# Patient Record
Sex: FEMALE | Race: WHITE | NOT HISPANIC OR LATINO | Employment: FULL TIME | URBAN - METROPOLITAN AREA
[De-identification: names, ages, dates, MRNs, and addresses within clinical notes are randomized per-mention and may not be internally consistent; named-entity substitution may affect disease eponyms.]

---

## 2018-05-08 ENCOUNTER — ANESTHESIA - HEALTHEAST (OUTPATIENT)
Dept: SURGERY | Facility: HOSPITAL | Age: 33
End: 2018-05-08

## 2018-05-08 ASSESSMENT — MIFFLIN-ST. JEOR
SCORE: 2038.53
SCORE: 1848.02

## 2018-05-09 ENCOUNTER — SURGERY - HEALTHEAST (OUTPATIENT)
Dept: SURGERY | Facility: HOSPITAL | Age: 33
End: 2018-05-09

## 2021-05-26 ENCOUNTER — RECORDS - HEALTHEAST (OUTPATIENT)
Dept: ADMINISTRATIVE | Facility: CLINIC | Age: 36
End: 2021-05-26

## 2021-05-27 ENCOUNTER — RECORDS - HEALTHEAST (OUTPATIENT)
Dept: ADMINISTRATIVE | Facility: CLINIC | Age: 36
End: 2021-05-27

## 2021-05-28 ENCOUNTER — RECORDS - HEALTHEAST (OUTPATIENT)
Dept: ADMINISTRATIVE | Facility: CLINIC | Age: 36
End: 2021-05-28

## 2021-05-29 ENCOUNTER — RECORDS - HEALTHEAST (OUTPATIENT)
Dept: ADMINISTRATIVE | Facility: CLINIC | Age: 36
End: 2021-05-29

## 2021-06-01 ENCOUNTER — RECORDS - HEALTHEAST (OUTPATIENT)
Dept: ADMINISTRATIVE | Facility: CLINIC | Age: 36
End: 2021-06-01

## 2021-06-01 VITALS — BODY MASS INDEX: 48.82 KG/M2 | HEIGHT: 65 IN | WEIGHT: 293 LBS

## 2021-06-02 ENCOUNTER — RECORDS - HEALTHEAST (OUTPATIENT)
Dept: ADMINISTRATIVE | Facility: CLINIC | Age: 36
End: 2021-06-02

## 2021-06-17 NOTE — ANESTHESIA CARE TRANSFER NOTE
Last vitals:   Vitals:    05/09/18 0851   BP: 127/83   Pulse: 80   Resp: 20   Temp: 36.9  C (98.4  F)   SpO2: 96%     Patient's level of consciousness is awake  Spontaneous respirations: yes  Maintains airway independently: yes  Dentition unchanged: yes  Oropharynx: oropharynx clear of all foreign objects    QCDR Measures:  ASA# 20 - Surgical Safety Checklist: WHO surgical safety checklist completed prior to induction  PQRS# 430 - Adult PONV Prevention: 4558F - Pt received => 2 anti-emetic agents (different classes) preop & intraop  ASA# 8 - Peds PONV Prevention: NA - Not pediatric patient, not GA or 2 or more risk factors NOT present  PQRS# 424 - Cheryle-op Temp Management: 4559F - At least one body temp DOCUMENTED => 35.5C or 95.9F within required timeframe  PQRS# 426 - PACU Transfer Protocol: - Transfer of care checklist used  ASA# 14 - Acute Post-op Pain: ASA14B - Patient did NOT experience pain >= 7 out of 10

## 2021-06-17 NOTE — ANESTHESIA POSTPROCEDURE EVALUATION
Patient: Diamond Mendoza  WIDE LOCAL EXCISION OF VULVA CO2 LASER  Anesthesia type: MAC    Patient location: Phase II Recovery  Last vitals:   Vitals:    05/09/18 1000   BP:    Pulse:    Resp: 20   Temp:    SpO2:      Post vital signs: stable  Level of consciousness: awake and responds to simple questions  Post-anesthesia pain: pain controlled  Post-anesthesia nausea and vomiting: no  Pulmonary: unassisted  Cardiovascular: stable  Hydration: adequate  Anesthetic events: no    QCDR Measures:  ASA# 11 - Cheryle-op Cardiac Arrest: ASA11B - Patient did NOT experience unanticipated cardiac arrest  ASA# 12 - Cheryle-op Mortality Rate: ASA12B - Patient did NOT die  ASA# 13 - PACU Re-Intubation Rate: NA - No ETT / LMA used for case  ASA# 10 - Composite Anes Safety: ASA10A - No serious adverse event    Additional Notes:

## 2021-07-03 NOTE — ANESTHESIA PREPROCEDURE EVALUATION
Anesthesia Preprocedure Evaluation by Aylin Mitchell MD at 5/9/2018  6:54 AM     Author: Aylin Mitchell MD Service: -- Author Type: Physician    Filed: 5/9/2018  6:55 AM Date of Service: 5/9/2018  6:54 AM Status: Signed    : Aylin Mitchell MD (Physician)       Anesthesia Evaluation      Patient summary reviewed   No history of anesthetic complications     Airway   Mallampati: II  Neck ROM: full   Pulmonary - normal exam   (+) sleep apnea on no CPAP, , a smoker                         Cardiovascular - negative ROS and normal exam   Neuro/Psych    (+) depression, anxiety/panic attacks,     Endo/Other    (+) obesity (morbid),      GI/Hepatic/Renal - negative ROS           Dental                             Anesthesia Plan  Planned anesthetic: MAC  - will convert to LMA if patient having issues with MILY  - avoid fentanyl  - propofol infusion, ketamine boluses prn  ASA 3   Induction: intravenous   Anesthetic plan and risks discussed with: patient    Post-op plan: routine recovery

## 2023-03-20 ENCOUNTER — OFFICE VISIT (OUTPATIENT)
Dept: FAMILY MEDICINE | Facility: CLINIC | Age: 38
End: 2023-03-20
Payer: COMMERCIAL

## 2023-03-20 VITALS
HEART RATE: 71 BPM | BODY MASS INDEX: 48.82 KG/M2 | HEIGHT: 65 IN | TEMPERATURE: 97.9 F | RESPIRATION RATE: 14 BRPM | DIASTOLIC BLOOD PRESSURE: 74 MMHG | WEIGHT: 293 LBS | OXYGEN SATURATION: 95 % | SYSTOLIC BLOOD PRESSURE: 115 MMHG

## 2023-03-20 DIAGNOSIS — R06.83 SNORING: ICD-10-CM

## 2023-03-20 DIAGNOSIS — Z13.220 LIPID SCREENING: ICD-10-CM

## 2023-03-20 DIAGNOSIS — R20.2 TINGLING OF BOTH UPPER EXTREMITIES: ICD-10-CM

## 2023-03-20 DIAGNOSIS — Z11.59 NEED FOR HEPATITIS C SCREENING TEST: ICD-10-CM

## 2023-03-20 DIAGNOSIS — R22.9 LOCALIZED SUPERFICIAL SWELLING, MASS, OR LUMP: ICD-10-CM

## 2023-03-20 DIAGNOSIS — Z11.4 SCREENING FOR HIV (HUMAN IMMUNODEFICIENCY VIRUS): ICD-10-CM

## 2023-03-20 DIAGNOSIS — E66.01 MORBID OBESITY (H): Primary | ICD-10-CM

## 2023-03-20 DIAGNOSIS — Z13.29 SCREENING FOR THYROID DISORDER: ICD-10-CM

## 2023-03-20 LAB
ALBUMIN SERPL BCG-MCNC: 4.2 G/DL (ref 3.5–5.2)
ALP SERPL-CCNC: 62 U/L (ref 35–104)
ALT SERPL W P-5'-P-CCNC: 25 U/L (ref 10–35)
ANION GAP SERPL CALCULATED.3IONS-SCNC: 11 MMOL/L (ref 7–15)
AST SERPL W P-5'-P-CCNC: 32 U/L (ref 10–35)
BILIRUB SERPL-MCNC: 0.3 MG/DL
BUN SERPL-MCNC: 14.9 MG/DL (ref 6–20)
CALCIUM SERPL-MCNC: 9.7 MG/DL (ref 8.6–10)
CHLORIDE SERPL-SCNC: 106 MMOL/L (ref 98–107)
CHOLEST SERPL-MCNC: 174 MG/DL
CREAT SERPL-MCNC: 0.65 MG/DL (ref 0.51–0.95)
DEPRECATED HCO3 PLAS-SCNC: 26 MMOL/L (ref 22–29)
GFR SERPL CREATININE-BSD FRML MDRD: >90 ML/MIN/1.73M2
GLUCOSE SERPL-MCNC: 86 MG/DL (ref 70–99)
HBA1C MFR BLD: 5.2 % (ref 0–5.6)
HDLC SERPL-MCNC: 30 MG/DL
LDLC SERPL CALC-MCNC: 112 MG/DL
NONHDLC SERPL-MCNC: 144 MG/DL
POTASSIUM SERPL-SCNC: 4.4 MMOL/L (ref 3.4–5.3)
PROT SERPL-MCNC: 7.1 G/DL (ref 6.4–8.3)
SODIUM SERPL-SCNC: 143 MMOL/L (ref 136–145)
TRIGL SERPL-MCNC: 159 MG/DL
TSH SERPL DL<=0.005 MIU/L-ACNC: 1.05 UIU/ML (ref 0.3–4.2)

## 2023-03-20 PROCEDURE — 87389 HIV-1 AG W/HIV-1&-2 AB AG IA: CPT | Performed by: NURSE PRACTITIONER

## 2023-03-20 PROCEDURE — 80053 COMPREHEN METABOLIC PANEL: CPT | Performed by: NURSE PRACTITIONER

## 2023-03-20 PROCEDURE — 90471 IMMUNIZATION ADMIN: CPT | Performed by: NURSE PRACTITIONER

## 2023-03-20 PROCEDURE — 80061 LIPID PANEL: CPT | Performed by: NURSE PRACTITIONER

## 2023-03-20 PROCEDURE — 83036 HEMOGLOBIN GLYCOSYLATED A1C: CPT | Performed by: NURSE PRACTITIONER

## 2023-03-20 PROCEDURE — 99204 OFFICE O/P NEW MOD 45 MIN: CPT | Mod: 25 | Performed by: NURSE PRACTITIONER

## 2023-03-20 PROCEDURE — 84443 ASSAY THYROID STIM HORMONE: CPT | Performed by: NURSE PRACTITIONER

## 2023-03-20 PROCEDURE — 86803 HEPATITIS C AB TEST: CPT | Performed by: NURSE PRACTITIONER

## 2023-03-20 PROCEDURE — 90677 PCV20 VACCINE IM: CPT | Performed by: NURSE PRACTITIONER

## 2023-03-20 PROCEDURE — 36415 COLL VENOUS BLD VENIPUNCTURE: CPT | Performed by: NURSE PRACTITIONER

## 2023-03-20 ASSESSMENT — PATIENT HEALTH QUESTIONNAIRE - PHQ9: SUM OF ALL RESPONSES TO PHQ QUESTIONS 1-9: 3

## 2023-03-20 NOTE — PATIENT INSTRUCTIONS
"      Start using GlassBox Khadra. Try to use it 24 hours, every day for at least one week. This will help with accountability  Start Weighing yourself every day.     Nutrition Goals  1) Follow 4322-2263 calorie/day plan; can use [employer] khadra to help with diary.               -www.eatthismuch.com     2) 9\" Plate method (1/2 plate non-starchy vegetables/fruit, 1/4 plate lean protein, 1/4 plate whole grain starch - no more than 1/2 cup carb/meal)    3) Continue physical activity; can start with 10 minutes per day like going for a walk after dinner or at your lunchtime.    4) Meal prep tips: https://www.Afluenta.GiveCorps/healthyeats/healthy-tips/2019/10/meal-prep-tips-hacks-experts  5) Avoid snacking as able. If snack is needed use lean protein and/or fruit/vegetable. Examples:              - 2 cup popcorn              - 1 cup mixed berries              - 15 almonds, walnuts, cashews              - carrot/celery sticks and 2 tbsp low-fat ranch              - 1 hard boiled egg              - Part-skim mozzarella cheese stick              - Low-fat, low-sugar greek yogurt with 1/2 cup berries              - Med apple or pear              - sliced bell peppers with 1/2 cup salsa              - 1/2 cup roasted chickpeas              - sliced cucumbers with vinegar     100 calorie sweets: Smart Sweets, Fiber One desserts, Fit and Active 100 calorie snack sweets at [employer]; [employer]abis[employer] 100 calorie pre-portioned cookies, sugar-free pudding, sugar-free jello.     Snack Recipes:  - Banana and creamy PB dip (https://www.diabetesfoodhub.org/recipes/sweet-peanut-buttery-dip.html?home-category_id=23)  - Roasted chickpeas (https://www.diabetesfoodhub.org/recipes/roasted-and-spiced-chickpeas.html?home-category_id=23)  - Lemon Raspberry peyman seed pudding (https://www.diabetesfoodhub.org/recipes/lemon-raspberry- peyman-seed-pudding.html?home-category_id=23)  - Black bean hummus with carrot and celery sticks " "(https://www.diabetesfoodhub.org/recipes/black-bean-hummus.html?home-category_id=23)  - Greek yogurt chocolate mouse (https://www.diabetesfoodhub.org/recipes/greek-yogurt-chocolate-mousse.html?home-category_id=23)   - Broccoli Cheese Bites  (https://www.diabetesfoodhub.org/recipes/broccoli-cheese-bites.html?home-category_id=20)  - Chicken Satay with peanut sauce  (https://www.diabetesfoodAdap.tvb.org/recipes/blueberry-almond-chicken-salad-lettuce-wraps.html?home-category_id=20)  - Deviled Eggs  (https://www.diabetesfoodAdap.tvb.org/recipes/devilled-eggs.html?home-category_id=20)     Healthy Recipe Resources:  Books:  \"The Volumetrics Eating Plan\" by Ameena Mobley, Ph.D.  \"Cooking that Counts\" by editors of [employer]  \"Calorie Smart Meals\" cookbook by [employer] (200-500 calorie meals)     Websites:  www.MedAptus  www.myThings.org  https://www.diabetesfoodAdap.tvb.org/all-recipes.html  https://www.Lukkin.[employer]/  Https://www.Eventialsmyplate.gov/myplatekitchen  https://snaped.fns.usda.gov/recipes-menus   https://www.Life in Hi-Fi.[employer] Technology/gallery/7817226/dietitian-budget-high-protein-dinner-recipes/  https://www.authorSTREAM.com.[employer] X[employer] Technology/recipes/84/healthy-recipes/         Cultural Cuisines:  https://www.Life in Hi-Fi.com/recipes/86619/cuisines-regions//  https://www.Scoot NetworksnatHarlyn Medical.org/knowledge-center/recipes/  https://REPP/recipe-index/     Apps:  tuul jennifer (or website, mealime.com)   Sacha         The Plate Method  Http://www.Cynny/267969.pdf     Protein Sources   http://Cynny/638052.pdf      Carbohydrates  http://fvfiles.com/617124.pdf      Mindful Eating  http://Cynny/769185.pdf      Summary of Volumetrics Eating Plan  http://fvfiles.com/669696.pdf     "

## 2023-03-20 NOTE — PROGRESS NOTES
Assessment & Plan     Screening for HIV (human immunodeficiency virus)    - HIV Antigen Antibody Combo  - HIV Antigen Antibody Combo    Need for hepatitis C screening test    - Hepatitis C Screen Reflex to HCV RNA Quant and Genotype  - Hepatitis C Screen Reflex to HCV RNA Quant and Genotype    Morbid obesity (H)    - Comprehensive Weight Management  - Hemoglobin A1c  - Comprehensive metabolic panel (BMP + Alb, Alk Phos, ALT, AST, Total. Bili, TP)  - Lipid Profile (Chol, Trig, HDL, LDL calc)  - Hemoglobin A1c  - Comprehensive metabolic panel (BMP + Alb, Alk Phos, ALT, AST, Total. Bili, TP)  - Lipid Profile (Chol, Trig, HDL, LDL calc)    Localized superficial swelling, mass, or lump    - Adult General Surg Referral    Screening for thyroid disorder    - TSH  - TSH    Tingling of both upper extremities  - TSH   - HGBA1c\    Snoring  - symptoms of sleep apnea reviewed, she will monitor.    - labs appear stable. Your cholesterol is within a normal range, your HDL (good chol) is low. Ways to improve this value is by exercising more often. You can just start slow at 10 minutes per day, and then gradually increase. Recheck this lab in five years.   - tingling in fingers, leaning towards the onset of Carpal tunnel. TSH and hgba1c normal ranges. Conservative measures discussed. Follow up PRN.   - vitamin D supplements discussed.   - I spent time discussing lifestyle changes today. She is willing to follow up with weight management team.   - pap completed in 2020 per her recall. If IUD was placed in 2018 (per our records) she is due for a change and repeat PAP.                Nicotine/Tobacco Cessation:  She reports that she has been smoking cigarettes. She started smoking about 20 years ago. She has been smoking an average of .25 packs per day. She has never used smokeless tobacco.  Nicotine/Tobacco Cessation Plan:   Information offered: Patient not interested at this time      BMI:   Estimated body mass index is 51.59  "kg/m  as calculated from the following:    Height as of this encounter: 1.651 m (5' 5\").    Weight as of this encounter: 140.6 kg (310 lb).   Weight management plan: Patient referred to endocrine and/or weight management specialty        No follow-ups on file.    KIMBERLYN Ybarra CNP  [facility] FATUMA Fields is a 37 year old, presenting for the following health issues:  Establish Care (Wrist pain in both hands, lump on head), Musculoskeletal Problem, and Mass (Lump on top of head)    - would like to lose weight. Drinks two soda's per day and one sugar free energy drink. She drinks no juice. Drinks skim milk. She knows her diet is not the best. She does endorse snoring at night and feels tired during the day. She can get muscular pains. She has been noticing numbness and tingling in fingers, sometimes her arms fall asleep at night, and can get bilateral wrist pains. Her pap is up to date she believes back in 1-2020. She has an IUD but can not remember exactly when it was placed. Went to [facility].     Musculoskeletal Problem  Associated symptoms include fatigue, myalgias and numbness. Pertinent negatives include no chills or fever.   Mass  Associated symptoms include fatigue, myalgias and numbness. Pertinent negatives include no chills or fever.   History of Present Illness       Reason for visit:  Establishing care    She eats 2-3 servings of fruits and vegetables daily.She consumes 2 sweetened beverage(s) daily.She exercises with enough effort to increase her heart rate 30 to 60 minutes per day.  She exercises with enough effort to increase her heart rate 5 days per week.   She is taking medications regularly.             Review of Systems   Constitutional: Positive for fatigue. Negative for activity change, appetite change, chills, fever and unexpected weight change.   Eyes: Negative.    Respiratory: Negative.    Cardiovascular: Negative.    Gastrointestinal: " "Negative.    Endocrine: Negative.    Breasts:  negative.    Genitourinary: Negative.    Musculoskeletal: Positive for myalgias.   Allergic/Immunologic: Negative.    Neurological: Positive for numbness.   Hematological: Negative.    Psychiatric/Behavioral: Negative.             Objective    /74   Pulse 71   Temp 97.9  F (36.6  C) (Oral)   Resp 14   Ht 1.651 m (5' 5\")   Wt 140.6 kg (310 lb)   SpO2 95%   BMI 51.59 kg/m    Body mass index is 51.59 kg/m .  Physical Exam  Vitals and nursing note reviewed.   Constitutional:       Appearance: Normal appearance. She is obese.   HENT:      Mouth/Throat:      Mouth: Mucous membranes are moist.   Pulmonary:      Effort: Pulmonary effort is normal.   Musculoskeletal:      Right lower leg: No edema.      Left lower leg: No edema.   Skin:     General: Skin is warm.      Capillary Refill: Capillary refill takes less than 2 seconds.      Findings: Lesion present.      Comments: Nodule mobile cyst noted under scalp; right side, top of head, no skin color changes, no pain. Quarter size   Neurological:      General: No focal deficit present.      Mental Status: She is alert and oriented to person, place, and time.   Psychiatric:         Mood and Affect: Mood normal.         Behavior: Behavior normal.         Thought Content: Thought content normal.         Judgment: Judgment normal.            Results for orders placed or performed in visit on 03/20/23   HIV Antigen Antibody Combo     Status: Normal   Result Value Ref Range    HIV Antigen Antibody Combo Nonreactive Nonreactive   Hepatitis C Screen Reflex to HCV RNA Quant and Genotype     Status: Normal   Result Value Ref Range    Hepatitis C Antibody Nonreactive Nonreactive    Narrative    Assay performance characteristics have not been established for newborns, infants, and children.   Hemoglobin A1c     Status: Normal   Result Value Ref Range    Hemoglobin A1C 5.2 0.0 - 5.6 %   Comprehensive metabolic panel (BMP + Alb, " Alk Phos, ALT, AST, Total. Bili, TP)     Status: Normal   Result Value Ref Range    Sodium 143 136 - 145 mmol/L    Potassium 4.4 3.4 - 5.3 mmol/L    Chloride 106 98 - 107 mmol/L    Carbon Dioxide (CO2) 26 22 - 29 mmol/L    Anion Gap 11 7 - 15 mmol/L    Urea Nitrogen 14.9 6.0 - 20.0 mg/dL    Creatinine 0.65 0.51 - 0.95 mg/dL    Calcium 9.7 8.6 - 10.0 mg/dL    Glucose 86 70 - 99 mg/dL    Alkaline Phosphatase 62 35 - 104 U/L    AST 32 10 - 35 U/L    ALT 25 10 - 35 U/L    Protein Total 7.1 6.4 - 8.3 g/dL    Albumin 4.2 3.5 - 5.2 g/dL    Bilirubin Total 0.3 <=1.2 mg/dL    GFR Estimate >90 >60 mL/min/1.73m2   Lipid Profile (Chol, Trig, HDL, LDL calc)     Status: Abnormal   Result Value Ref Range    Cholesterol 174 <200 mg/dL    Triglycerides 159 (H) <150 mg/dL    Direct Measure HDL 30 (L) >=50 mg/dL    LDL Cholesterol Calculated 112 (H) <=100 mg/dL    Non HDL Cholesterol 144 (H) <130 mg/dL    Narrative    Cholesterol  Desirable:  <200 mg/dL    Triglycerides  Normal:  Less than 150 mg/dL  Borderline High:  150-199 mg/dL  High:  200-499 mg/dL  Very High:  Greater than or equal to 500 mg/dL    Direct Measure HDL  Female:  Greater than or equal to 50 mg/dL   Male:  Greater than or equal to 40 mg/dL    LDL Cholesterol  Desirable:  <100mg/dL  Above Desirable:  100-129 mg/dL   Borderline High:  130-159 mg/dL   High:  160-189 mg/dL   Very High:  >= 190 mg/dL    Non HDL Cholesterol  Desirable:  130 mg/dL  Above Desirable:  130-159 mg/dL  Borderline High:  160-189 mg/dL  High:  190-219 mg/dL  Very High:  Greater than or equal to 220 mg/dL   TSH     Status: Normal   Result Value Ref Range    TSH 1.05 0.30 - 4.20 uIU/mL

## 2023-03-21 LAB
HCV AB SERPL QL IA: NONREACTIVE
HIV 1+2 AB+HIV1 P24 AG SERPL QL IA: NONREACTIVE

## 2023-03-21 ASSESSMENT — ENCOUNTER SYMPTOMS
FEVER: 0
ACTIVITY CHANGE: 0
GASTROINTESTINAL NEGATIVE: 1
PSYCHIATRIC NEGATIVE: 1
UNEXPECTED WEIGHT CHANGE: 0
EYES NEGATIVE: 1
CHILLS: 0
CARDIOVASCULAR NEGATIVE: 1
MYALGIAS: 1
ALLERGIC/IMMUNOLOGIC NEGATIVE: 1
FATIGUE: 1
HEMATOLOGIC/LYMPHATIC NEGATIVE: 1
NUMBNESS: 1
RESPIRATORY NEGATIVE: 1
APPETITE CHANGE: 0
ENDOCRINE NEGATIVE: 1

## 2023-03-21 NOTE — RESULT ENCOUNTER NOTE
HI     Your labs appear stable. Your cholesterol is within a normal range, but your HDL (good chol) is low. Ways to improve this value is by exercising more often. You can just start slow at 10 minutes per day, and then gradually increase. Recheck this lab in five years.     Good news is your diabetes test is negative and TSH is within a normal range.     If you have any questions, please let me know. Also, please remember to check on your IUD insertion, you may need it changed.     Ashley

## 2023-04-07 ENCOUNTER — OFFICE VISIT (OUTPATIENT)
Dept: SURGERY | Facility: CLINIC | Age: 38
End: 2023-04-07
Attending: NURSE PRACTITIONER
Payer: COMMERCIAL

## 2023-04-07 ENCOUNTER — TELEPHONE (OUTPATIENT)
Dept: SURGERY | Facility: CLINIC | Age: 38
End: 2023-04-07

## 2023-04-07 VITALS
WEIGHT: 293 LBS | DIASTOLIC BLOOD PRESSURE: 88 MMHG | HEIGHT: 65 IN | SYSTOLIC BLOOD PRESSURE: 126 MMHG | BODY MASS INDEX: 48.82 KG/M2

## 2023-04-07 DIAGNOSIS — R22.9 LOCALIZED SUPERFICIAL SWELLING, MASS, OR LUMP: ICD-10-CM

## 2023-04-07 DIAGNOSIS — L72.3 SEBACEOUS CYST: Primary | ICD-10-CM

## 2023-04-07 PROCEDURE — 99203 OFFICE O/P NEW LOW 30 MIN: CPT | Performed by: SPECIALIST

## 2023-04-07 RX ORDER — CEFAZOLIN SODIUM/WATER 3 G/30 ML
3 SYRINGE (ML) INTRAVENOUS SEE ADMIN INSTRUCTIONS
Status: CANCELLED | OUTPATIENT
Start: 2023-06-01

## 2023-04-07 RX ORDER — ACETAMINOPHEN 325 MG/1
975 TABLET ORAL ONCE
Status: CANCELLED | OUTPATIENT
Start: 2023-06-01 | End: 2023-04-07

## 2023-04-07 RX ORDER — CEFAZOLIN SODIUM/WATER 3 G/30 ML
3 SYRINGE (ML) INTRAVENOUS
Status: CANCELLED | OUTPATIENT
Start: 2023-06-01

## 2023-04-07 NOTE — LETTER
4/7/2023         RE: Diamond Mendoza  [address]  [place] [address]        Dear Colleague,    Thank you for referring your patient, Diamond Mendoza, to the [facility] [place]. Please see a copy of my visit note below.        [facility] Surgery Consult    HPI:    37 year old year old female who I have been consulted by No Ref-Primary, Physician for evaluation of Cyst (Cyst on top right side of head, has been there for about 15 years, starting to get painful recently)  This is a cyst on the top of her head she had for many years became more painful recently.  She also another wound is developed next to it which is enlarging 2.    Allergies:Cortisone    History reviewed. No pertinent past medical history.    Past Surgical History:   Procedure Laterality Date     ANKLE SURGERY Left      BIOPSY CERVICAL, LOCAL EXCISION, SINGLE/MULTIPLE       LAPAROSCOPIC CHOLECYSTECTOMY N/A 05/09/2018    Procedure: CHOLECYSTECTOMY LAPAROSCOPIC;  Surgeon: Leslie Nash MD;  Location: [facility];  Service:      LAPAROSCOPY DIAGNOSTIC (GENERAL) N/A 05/09/2018    Procedure: WIDE LOCAL EXCISION OF VULVA CO2 LASER;  Surgeon: Lelia Kauffman MD;  Location: [facility];  Service:        CURRENT MEDS:  Current Outpatient Medications   Medication     HEXAVITAMINS PO     levonorgestrel (MIRENA) 20 mcg/24 hr (5 years) IUD     No current facility-administered medications for this visit.       Family History   Problem Relation Age of Onset     Diabetes Mother      Mental Illness Sister      Mental Illness Brother         reports that she has been smoking cigarettes. She started smoking about 20 years ago. She has been smoking an average of .25 packs per day. She has never used smokeless tobacco. She reports current alcohol use. She reports that she does not use drugs.    Review of Systems:  Negative except cyst on her head x2 pretty large 1 on the right  "side.    OBJECTIVE:  Vitals:    04/07/23 1238   BP: 126/88   Weight: 142.4 kg (314 lb)   Height: 1.651 m (5' 5\")     Body mass index is 52.25 kg/m .    EXAM:  GENERAL: This is a well-developed 37 year old female who appears her stated age  HEAD: normocephalic  HEENT: Pupils equal and reactive bilaterally, cyst on her head x2 largest ones about 3 to 4 cm in diameter.  MOUTH: mucus membranes intact  CARDIAC: RRR without murmur  CHEST/LUNG:  Clear to auscultation  ABDOMEN: Soft, nontender, nondistended, no masses  EXTREMITIES: Grossly normal, warm,   NEUROLOGIC: Focally intact, nonfocal  INTEGUMENT: No open lesions or ulcers  VASCULAR: Pulses intact, symmetrical upper and lower extremities.    Lab Results   Component Value Date    ALT 25 03/20/2023    AST 32 03/20/2023    ALKPHOS 62 03/20/2023        Assessment/Plan:   Cyst on her head and her scalp.  Most likely sebaceous    Discussed excision and closure under local MAC anesthesia in same-day surgery setting.  With this set up in the near future discussed risk and benefits and she wished to proceed.    No follow-ups on file.    Sg Clark MD  General Surgery [phone]  Vascular Surgery [phone]          Again, thank you for allowing me to participate in the care of your patient.        Sincerely,        Sg Clark MD    "

## 2023-04-07 NOTE — PROGRESS NOTES
"[facility] Surgery Consult    HPI:    37 year old year old female who I have been consulted by No Ref-Primary, Physician for evaluation of Cyst (Cyst on top right side of head, has been there for about 15 years, starting to get painful recently)  This is a cyst on the top of her head she had for many years became more painful recently.  She also another wound is developed next to it which is enlarging 2.    Allergies:Cortisone    History reviewed. No pertinent past medical history.    Past Surgical History:   Procedure Laterality Date     ANKLE SURGERY Left      BIOPSY CERVICAL, LOCAL EXCISION, SINGLE/MULTIPLE       LAPAROSCOPIC CHOLECYSTECTOMY N/A 05/09/2018    Procedure: CHOLECYSTECTOMY LAPAROSCOPIC;  Surgeon: Leslie Nash MD;  Location: [facility];  Service:      LAPAROSCOPY DIAGNOSTIC (GENERAL) N/A 05/09/2018    Procedure: WIDE LOCAL EXCISION OF VULVA CO2 LASER;  Surgeon: Lelia Kauffman MD;  Location: [facility];  Service:        CURRENT MEDS:  Current Outpatient Medications   Medication     HEXAVITAMINS PO     levonorgestrel (MIRENA) 20 mcg/24 hr (5 years) IUD     No current facility-administered medications for this visit.       Family History   Problem Relation Age of Onset     Diabetes Mother      Mental Illness Sister      Mental Illness Brother         reports that she has been smoking cigarettes. She started smoking about 20 years ago. She has been smoking an average of .25 packs per day. She has never used smokeless tobacco. She reports current alcohol use. She reports that she does not use drugs.    Review of Systems:  Negative except cyst on her head x2 pretty large 1 on the right side.    OBJECTIVE:  Vitals:    04/07/23 1238   BP: 126/88   Weight: 142.4 kg (314 lb)   Height: 1.651 m (5' 5\")     Body mass index is 52.25 kg/m .    EXAM:  GENERAL: This is a well-developed 37 year old female who appears her stated age  HEAD: normocephalic  HEENT: Pupils equal and " reactive bilaterally, cyst on her head x2 largest ones about 3 to 4 cm in diameter.  MOUTH: mucus membranes intact  CARDIAC: RRR without murmur  CHEST/LUNG:  Clear to auscultation  ABDOMEN: Soft, nontender, nondistended, no masses  EXTREMITIES: Grossly normal, warm,   NEUROLOGIC: Focally intact, nonfocal  INTEGUMENT: No open lesions or ulcers  VASCULAR: Pulses intact, symmetrical upper and lower extremities.    Lab Results   Component Value Date    ALT 25 03/20/2023    AST 32 03/20/2023    ALKPHOS 62 03/20/2023        Assessment/Plan:   Cyst on her head and her scalp.  Most likely sebaceous    Discussed excision and closure under local MAC anesthesia in same-day surgery setting.  With this set up in the near future discussed risk and benefits and she wished to proceed.    No follow-ups on file.    Sg Clark MD  General Surgery [phone]  Vascular Surgery [phone]

## 2023-05-14 ENCOUNTER — HEALTH MAINTENANCE LETTER (OUTPATIENT)
Age: 38
End: 2023-05-14

## 2023-05-22 ENCOUNTER — OFFICE VISIT (OUTPATIENT)
Dept: FAMILY MEDICINE | Facility: CLINIC | Age: 38
End: 2023-05-22
Payer: COMMERCIAL

## 2023-05-22 VITALS
HEIGHT: 65 IN | WEIGHT: 293 LBS | BODY MASS INDEX: 48.82 KG/M2 | DIASTOLIC BLOOD PRESSURE: 74 MMHG | SYSTOLIC BLOOD PRESSURE: 110 MMHG | OXYGEN SATURATION: 96 % | TEMPERATURE: 98 F | HEART RATE: 80 BPM

## 2023-05-22 DIAGNOSIS — R06.83 SNORING: ICD-10-CM

## 2023-05-22 DIAGNOSIS — Z01.818 PREOP GENERAL PHYSICAL EXAM: Primary | ICD-10-CM

## 2023-05-22 DIAGNOSIS — E66.01 MORBID OBESITY (H): ICD-10-CM

## 2023-05-22 DIAGNOSIS — F17.210 CIGARETTE SMOKER: ICD-10-CM

## 2023-05-22 DIAGNOSIS — L72.9 CYST OF SKIN: ICD-10-CM

## 2023-05-22 PROCEDURE — 99214 OFFICE O/P EST MOD 30 MIN: CPT | Performed by: NURSE PRACTITIONER

## 2023-05-22 NOTE — H&P (VIEW-ONLY)
[facility]  [address] [place] [id number]  Phone: [phone]  Fax: [phone]  Primary Provider: Delmis Saini  Pre-op Performing Provider: DELMIS SAINI      PREOPERATIVE EVALUATION:  Today's date: 5/22/2023    Diamond Mendoza is a 37 year old female who presents for a preoperative evaluation.      5/22/2023     2:47 PM   Additional Questions   Roomed by Area F     Surgical Information:  Surgery/Procedure: Excsion of head lesion/cyst  Surgery Location: [facility]  Surgeon: Dr. Clark   Surgery Date: 06/01/2023  Time of Surgery: 10:00am  Where patient plans to recover: At home with family  Fax number for surgical facility: Note does not need to be faxed, will be available electronically in Epic.    Assessment & Plan     The proposed surgical procedure is considered INTERMEDIATE risk.    Morbid obesity (H)      Cyst of skin      Snoring      Cigarette smoker  - smoking cessation discussed. She plans on quitting cold turkey    Preop general physical exam         - No identified additional risk factors other than previously addressed    Antiplatelet or Anticoagulation Medication Instructions:   - Patient is on no antiplatelet or anticoagulation medications.    Additional Medication Instructions:  Patient is on no additional chronic medications    RECOMMENDATION:  APPROVAL GIVEN to proceed with proposed procedure, without further diagnostic evaluation.        Subjective   HPI related to upcoming procedure: cyst on the top of her head, she had for many years, became more painful recently.          5/22/2023     2:41 PM   Preop Questions   1. Have you ever had a heart attack or stroke? No   2. Have you ever had surgery on your heart or blood vessels, such as a stent placement, a coronary artery bypass, or surgery on an artery in your head, neck, heart, or legs? No   3. Do you have chest pain with activity? No   4. Do you have a history of  heart  failure? No   5. Do you currently have a cold, bronchitis or symptoms of other infection? No   6. Do you have a cough, shortness of breath, or wheezing? No   7. Do you or anyone in your family have previous history of blood clots? Unknown; grandfather had a stent place   8. Do you or does anyone in your family have a serious bleeding problem such as prolonged bleeding following surgeries or cuts? No   9. Have you ever had problems with anemia or been told to take iron pills? No   10. Have you had any abnormal blood loss such as black, tarry or bloody stools, or abnormal vaginal bleeding? No   11. Have you ever had a blood transfusion? No   12. Are you willing to have a blood transfusion if it is medically needed before, during, or after your surgery? Yes   13. Have you or any of your relatives ever had problems with anesthesia? No   14. Do you have sleep apnea, excessive snoring or daytime drowsiness? No- snores   15. Do you have any artifical heart valves or other implanted medical devices like a pacemaker, defibrillator, or continuous glucose monitor? No   16. Do you have artificial joints? No   17. Are you allergic to latex? No   18. Is there any chance that you may be pregnant? No       Status of Chronic Conditions:  See problem list for active medical problems.  Problems all longstanding and stable, except as noted/documented.  See ROS for pertinent symptoms related to these conditions.      Review of Systems  CONSTITUTIONAL: NEGATIVE for fever, chills, change in weight  ENT/MOUTH: NEGATIVE for ear, mouth and throat problems  RESP: NEGATIVE for significant cough or SOB  CV: NEGATIVE for chest pain, palpitations or peripheral edema    Patient Active Problem List    Diagnosis Date Noted     Morbid obesity (H) 03/20/2023     Priority: Medium     Cholecystitis 05/09/2016     Priority: Medium     Anxiety      Priority: Medium     Depression      Priority: Medium      No past medical history on file.  Past Surgical  "History:   Procedure Laterality Date     ANKLE SURGERY Left      BIOPSY CERVICAL, LOCAL EXCISION, SINGLE/MULTIPLE       LAPAROSCOPIC CHOLECYSTECTOMY N/A 05/09/2018    Procedure: CHOLECYSTECTOMY LAPAROSCOPIC;  Surgeon: Leslie Nash MD;  Location: [facility];  Service:      LAPAROSCOPY DIAGNOSTIC (GENERAL) N/A 05/09/2018    Procedure: WIDE LOCAL EXCISION OF VULVA CO2 LASER;  Surgeon: Lelia Kauffman MD;  Location: [facility];  Service:      Current Outpatient Medications   Medication Sig Dispense Refill     levonorgestrel (MIRENA) 20 mcg/24 hr (5 years) IUD [LEVONORGESTREL (MIRENA) 20 MCG/24 HR (5 YEARS) IUD] 1 each by Intrauterine route.         Allergies   Allergen Reactions     Cortisone Hives        Social History     Tobacco Use     Smoking status: Every Day     Packs/day: 0.25     Types: Cigarettes     Start date: 3/6/2003     Smokeless tobacco: Never     Tobacco comments:     not interested   Vaping Use     Vaping status: Not on file   Substance Use Topics     Alcohol use: Yes     Comment: Alcoholic Drinks/day: occasional use     Family History   Problem Relation Age of Onset     Diabetes Mother      Mental Illness Brother      Diabetes Maternal Grandfather      No Known Problems Paternal Grandmother      No Known Problems Paternal Grandfather      History   Drug Use No         Objective     /74 (BP Location: Left arm, Patient Position: Sitting, Cuff Size: Adult Large)   Pulse 80   Temp 98  F (36.7  C) (Oral)   Ht 1.639 m (5' 4.53\")   Wt 143 kg (315 lb 3.2 oz)   SpO2 96%   BMI 53.22 kg/m      Physical Exam    GENERAL APPEARANCE: healthy, alert and no distress     EYES: EOMI, PERRL     HENT: ear canals and TM's normal and nose and mouth without ulcers or lesions     NECK: no adenopathy, no asymmetry, masses, or scars and thyroid normal to palpation     RESP: lungs clear to auscultation - no rales, rhonchi or wheezes     CV: regular rates and rhythm, normal S1 S2, no S3 or S4 " and no murmur, click or rub     ABDOMEN:  soft, nontender, no HSM or masses and bowel sounds normal     MS: extremities normal- no gross deformities noted, no evidence of inflammation in joints, FROM in all extremities.     SKIN: no suspicious lesions or rashes     NEURO: Normal strength and tone, sensory exam grossly normal, mentation intact and speech normal     PSYCH: mentation appears normal. and affect normal/bright     LYMPHATICS: No cervical adenopathy    Recent Labs   Lab Test 03/20/23  1447      POTASSIUM 4.4   CR 0.65   A1C 5.2      Diagnostics:  No results found for this or any previous visit (from the past 720 hour(s)).   No EKG required for low risk surgery (cataract, skin procedure, breast biopsy, etc).    Revised Cardiac Risk Index (RCRI):  The patient has the following serious cardiovascular risks for perioperative complications:   - No serious cardiac risks = 0 points     [id number]}         Signed Electronically by: KIMBERLYN Ybarra CNP  Copy of this evaluation report is provided to requesting physician.

## 2023-05-22 NOTE — PROGRESS NOTES
[facility]  [address] [place] [address]  Phone: [phone]  Fax: [phone]  Primary Provider: Delmis Saini  Pre-op Performing Provider: DELMIS SAINI      PREOPERATIVE EVALUATION:  Today's date: 5/22/2023    Diamond Mendoza is a 37 year old female who presents for a preoperative evaluation.      5/22/2023     2:47 PM   Additional Questions   Roomed by Area F     Surgical Information:  Surgery/Procedure: Excsion of head lesion/cyst  Surgery Location: [facility]  Surgeon: Dr. Clark   Surgery Date: 06/01/2023  Time of Surgery: 10:00am  Where patient plans to recover: At home with family  Fax number for surgical facility: Note does not need to be faxed, will be available electronically in Epic.    Assessment & Plan     The proposed surgical procedure is considered INTERMEDIATE risk.    Morbid obesity (H)      Cyst of skin      Snoring      Cigarette smoker  - smoking cessation discussed. She plans on quitting cold turkey    Preop general physical exam         - No identified additional risk factors other than previously addressed    Antiplatelet or Anticoagulation Medication Instructions:   - Patient is on no antiplatelet or anticoagulation medications.    Additional Medication Instructions:  Patient is on no additional chronic medications    RECOMMENDATION:  APPROVAL GIVEN to proceed with proposed procedure, without further diagnostic evaluation.        Subjective   HPI related to upcoming procedure: cyst on the top of her head, she had for many years, became more painful recently.          5/22/2023     2:41 PM   Preop Questions   1. Have you ever had a heart attack or stroke? No   2. Have you ever had surgery on your heart or blood vessels, such as a stent placement, a coronary artery bypass, or surgery on an artery in your head, neck, heart, or legs? No   3. Do you have chest pain with activity? No   4. Do you have a history of  heart  failure? No   5. Do you currently have a cold, bronchitis or symptoms of other infection? No   6. Do you have a cough, shortness of breath, or wheezing? No   7. Do you or anyone in your family have previous history of blood clots? Unknown; grandfather had a stent place   8. Do you or does anyone in your family have a serious bleeding problem such as prolonged bleeding following surgeries or cuts? No   9. Have you ever had problems with anemia or been told to take iron pills? No   10. Have you had any abnormal blood loss such as black, tarry or bloody stools, or abnormal vaginal bleeding? No   11. Have you ever had a blood transfusion? No   12. Are you willing to have a blood transfusion if it is medically needed before, during, or after your surgery? Yes   13. Have you or any of your relatives ever had problems with anesthesia? No   14. Do you have sleep apnea, excessive snoring or daytime drowsiness? No- snores   15. Do you have any artifical heart valves or other implanted medical devices like a pacemaker, defibrillator, or continuous glucose monitor? No   16. Do you have artificial joints? No   17. Are you allergic to latex? No   18. Is there any chance that you may be pregnant? No       Status of Chronic Conditions:  See problem list for active medical problems.  Problems all longstanding and stable, except as noted/documented.  See ROS for pertinent symptoms related to these conditions.      Review of Systems  CONSTITUTIONAL: NEGATIVE for fever, chills, change in weight  ENT/MOUTH: NEGATIVE for ear, mouth and throat problems  RESP: NEGATIVE for significant cough or SOB  CV: NEGATIVE for chest pain, palpitations or peripheral edema    Patient Active Problem List    Diagnosis Date Noted     Morbid obesity (H) 03/20/2023     Priority: Medium     Cholecystitis 05/09/2016     Priority: Medium     Anxiety      Priority: Medium     Depression      Priority: Medium      No past medical history on file.  Past Surgical  "History:   Procedure Laterality Date     ANKLE SURGERY Left      BIOPSY CERVICAL, LOCAL EXCISION, SINGLE/MULTIPLE       LAPAROSCOPIC CHOLECYSTECTOMY N/A 05/09/2018    Procedure: CHOLECYSTECTOMY LAPAROSCOPIC;  Surgeon: Leslie Nash MD;  Location: [facility];  Service:      LAPAROSCOPY DIAGNOSTIC (GENERAL) N/A 05/09/2018    Procedure: WIDE LOCAL EXCISION OF VULVA CO2 LASER;  Surgeon: Lelia Kauffman MD;  Location: [facility];  Service:      Current Outpatient Medications   Medication Sig Dispense Refill     levonorgestrel (MIRENA) 20 mcg/24 hr (5 years) IUD [LEVONORGESTREL (MIRENA) 20 MCG/24 HR (5 YEARS) IUD] 1 each by Intrauterine route.         Allergies   Allergen Reactions     Cortisone Hives        Social History     Tobacco Use     Smoking status: Every Day     Packs/day: 0.25     Types: Cigarettes     Start date: 3/6/2003     Smokeless tobacco: Never     Tobacco comments:     not interested   Vaping Use     Vaping status: Not on file   Substance Use Topics     Alcohol use: Yes     Comment: Alcoholic Drinks/day: occasional use     Family History   Problem Relation Age of Onset     Diabetes Mother      Mental Illness Brother      Diabetes Maternal Grandfather      No Known Problems Paternal Grandmother      No Known Problems Paternal Grandfather      History   Drug Use No         Objective     /74 (BP Location: Left arm, Patient Position: Sitting, Cuff Size: Adult Large)   Pulse 80   Temp 98  F (36.7  C) (Oral)   Ht 1.639 m (5' 4.53\")   Wt 143 kg (315 lb 3.2 oz)   SpO2 96%   BMI 53.22 kg/m      Physical Exam    GENERAL APPEARANCE: healthy, alert and no distress     EYES: EOMI, PERRL     HENT: ear canals and TM's normal and nose and mouth without ulcers or lesions     NECK: no adenopathy, no asymmetry, masses, or scars and thyroid normal to palpation     RESP: lungs clear to auscultation - no rales, rhonchi or wheezes     CV: regular rates and rhythm, normal S1 S2, no S3 or S4 " and no murmur, click or rub     ABDOMEN:  soft, nontender, no HSM or masses and bowel sounds normal     MS: extremities normal- no gross deformities noted, no evidence of inflammation in joints, FROM in all extremities.     SKIN: no suspicious lesions or rashes     NEURO: Normal strength and tone, sensory exam grossly normal, mentation intact and speech normal     PSYCH: mentation appears normal. and affect normal/bright     LYMPHATICS: No cervical adenopathy    Recent Labs   Lab Test 03/20/23  1447      POTASSIUM 4.4   CR 0.65   A1C 5.2      Diagnostics:  No results found for this or any previous visit (from the past 720 hour(s)).   No EKG required for low risk surgery (cataract, skin procedure, breast biopsy, etc).    Revised Cardiac Risk Index (RCRI):  The patient has the following serious cardiovascular risks for perioperative complications:   - No serious cardiac risks = 0 points     [id number]}         Signed Electronically by: KIMBERLYN Ybarra CNP  Copy of this evaluation report is provided to requesting physician.

## 2023-05-25 ENCOUNTER — LAB REQUISITION (OUTPATIENT)
Dept: LAB | Facility: CLINIC | Age: 38
End: 2023-05-25
Payer: COMMERCIAL

## 2023-05-25 DIAGNOSIS — Z12.4 ENCOUNTER FOR SCREENING FOR MALIGNANT NEOPLASM OF CERVIX: ICD-10-CM

## 2023-05-25 PROCEDURE — G0145 SCR C/V CYTO,THINLAYER,RESCR: HCPCS | Mod: ORL | Performed by: NURSE PRACTITIONER

## 2023-05-25 PROCEDURE — 87624 HPV HI-RISK TYP POOLED RSLT: CPT | Mod: ORL | Performed by: NURSE PRACTITIONER

## 2023-05-31 LAB
BKR LAB AP GYN ADEQUACY: NORMAL
BKR LAB AP GYN INTERPRETATION: NORMAL
BKR LAB AP HPV REFLEX: NORMAL
BKR LAB AP LMP: NORMAL
BKR LAB AP PREVIOUS ABNL DX: NORMAL
BKR LAB AP PREVIOUS ABNORMAL: NORMAL
PATH REPORT.COMMENTS IMP SPEC: NORMAL
PATH REPORT.COMMENTS IMP SPEC: NORMAL
PATH REPORT.RELEVANT HX SPEC: NORMAL

## 2023-06-01 ENCOUNTER — HOSPITAL ENCOUNTER (OUTPATIENT)
Facility: HOSPITAL | Age: 38
Discharge: HOME | End: 2023-06-01
Attending: SPECIALIST | Admitting: SPECIALIST
Payer: COMMERCIAL

## 2023-06-01 ENCOUNTER — ANESTHESIA EVENT (OUTPATIENT)
Dept: SURGERY | Facility: HOSPITAL | Age: 38
End: 2023-06-01
Payer: COMMERCIAL

## 2023-06-01 ENCOUNTER — ANESTHESIA (OUTPATIENT)
Dept: SURGERY | Facility: HOSPITAL | Age: 38
End: 2023-06-01
Payer: COMMERCIAL

## 2023-06-01 VITALS
RESPIRATION RATE: 18 BRPM | DIASTOLIC BLOOD PRESSURE: 89 MMHG | OXYGEN SATURATION: 96 % | SYSTOLIC BLOOD PRESSURE: 139 MMHG | HEART RATE: 69 BPM | BODY MASS INDEX: 50.02 KG/M2 | HEIGHT: 64 IN | WEIGHT: 293 LBS | TEMPERATURE: 98.2 F

## 2023-06-01 DIAGNOSIS — D36.7 DERMOID CYST OF HEAD: Primary | ICD-10-CM

## 2023-06-01 PROCEDURE — 250N000013 HC RX MED GY IP 250 OP 250 PS 637: Performed by: SPECIALIST

## 2023-06-01 PROCEDURE — 258N000003 HC RX IP 258 OP 636: Performed by: ANESTHESIOLOGY

## 2023-06-01 PROCEDURE — 250N000009 HC RX 250: Performed by: SPECIALIST

## 2023-06-01 PROCEDURE — 710N000012 HC RECOVERY PHASE 2, PER MINUTE: Performed by: SPECIALIST

## 2023-06-01 PROCEDURE — 11420 EXC H-F-NK-SP B9+MARG 0.5/<: CPT | Mod: 51 | Performed by: SPECIALIST

## 2023-06-01 PROCEDURE — 250N000011 HC RX IP 250 OP 636: Performed by: NURSE ANESTHETIST, CERTIFIED REGISTERED

## 2023-06-01 PROCEDURE — 370N000017 HC ANESTHESIA TECHNICAL FEE, PER MIN: Performed by: SPECIALIST

## 2023-06-01 PROCEDURE — 272N000001 HC OR GENERAL SUPPLY STERILE: Performed by: SPECIALIST

## 2023-06-01 PROCEDURE — 11422 EXC H-F-NK-SP B9+MARG 1.1-2: CPT | Performed by: SPECIALIST

## 2023-06-01 PROCEDURE — 360N000074 HC SURGERY LEVEL 1, PER MIN: Performed by: SPECIALIST

## 2023-06-01 PROCEDURE — 250N000009 HC RX 250: Performed by: NURSE ANESTHETIST, CERTIFIED REGISTERED

## 2023-06-01 PROCEDURE — 250N000011 HC RX IP 250 OP 636: Performed by: SPECIALIST

## 2023-06-01 PROCEDURE — 999N000141 HC STATISTIC PRE-PROCEDURE NURSING ASSESSMENT: Performed by: SPECIALIST

## 2023-06-01 RX ORDER — ONDANSETRON 2 MG/ML
4 INJECTION INTRAMUSCULAR; INTRAVENOUS EVERY 30 MIN PRN
Status: DISCONTINUED | OUTPATIENT
Start: 2023-06-01 | End: 2023-06-01 | Stop reason: HOSPADM

## 2023-06-01 RX ORDER — OXYCODONE HYDROCHLORIDE 5 MG/1
5 TABLET ORAL
Status: DISCONTINUED | OUTPATIENT
Start: 2023-06-01 | End: 2023-06-01 | Stop reason: HOSPADM

## 2023-06-01 RX ORDER — OXYCODONE HYDROCHLORIDE 5 MG/1
10 TABLET ORAL
Status: DISCONTINUED | OUTPATIENT
Start: 2023-06-01 | End: 2023-06-01 | Stop reason: HOSPADM

## 2023-06-01 RX ORDER — PROPOFOL 10 MG/ML
INJECTION, EMULSION INTRAVENOUS CONTINUOUS PRN
Status: DISCONTINUED | OUTPATIENT
Start: 2023-06-01 | End: 2023-06-01

## 2023-06-01 RX ORDER — PROPOFOL 10 MG/ML
INJECTION, EMULSION INTRAVENOUS PRN
Status: DISCONTINUED | OUTPATIENT
Start: 2023-06-01 | End: 2023-06-01

## 2023-06-01 RX ORDER — BUPIVACAINE HYDROCHLORIDE AND EPINEPHRINE 2.5; 5 MG/ML; UG/ML
INJECTION, SOLUTION EPIDURAL; INFILTRATION; INTRACAUDAL; PERINEURAL PRN
Status: DISCONTINUED | OUTPATIENT
Start: 2023-06-01 | End: 2023-06-01 | Stop reason: HOSPADM

## 2023-06-01 RX ORDER — KETAMINE HYDROCHLORIDE 10 MG/ML
INJECTION INTRAMUSCULAR; INTRAVENOUS PRN
Status: DISCONTINUED | OUTPATIENT
Start: 2023-06-01 | End: 2023-06-01

## 2023-06-01 RX ORDER — GLYCOPYRROLATE 0.2 MG/ML
INJECTION, SOLUTION INTRAMUSCULAR; INTRAVENOUS PRN
Status: DISCONTINUED | OUTPATIENT
Start: 2023-06-01 | End: 2023-06-01

## 2023-06-01 RX ORDER — CEFAZOLIN SODIUM/WATER 3 G/30 ML
3 SYRINGE (ML) INTRAVENOUS
Status: COMPLETED | OUTPATIENT
Start: 2023-06-01 | End: 2023-06-01

## 2023-06-01 RX ORDER — SODIUM CHLORIDE, SODIUM LACTATE, POTASSIUM CHLORIDE, CALCIUM CHLORIDE 600; 310; 30; 20 MG/100ML; MG/100ML; MG/100ML; MG/100ML
INJECTION, SOLUTION INTRAVENOUS CONTINUOUS
Status: DISCONTINUED | OUTPATIENT
Start: 2023-06-01 | End: 2023-06-01 | Stop reason: HOSPADM

## 2023-06-01 RX ORDER — LIDOCAINE 40 MG/G
CREAM TOPICAL
Status: DISCONTINUED | OUTPATIENT
Start: 2023-06-01 | End: 2023-06-01 | Stop reason: HOSPADM

## 2023-06-01 RX ORDER — HYDROCODONE BITARTRATE AND ACETAMINOPHEN 5; 325 MG/1; MG/1
1-2 TABLET ORAL EVERY 6 HOURS PRN
Qty: 15 TABLET | Refills: 0 | Status: SHIPPED | OUTPATIENT
Start: 2023-06-01 | End: 2023-06-04

## 2023-06-01 RX ORDER — ACETAMINOPHEN 325 MG/1
975 TABLET ORAL ONCE
Status: COMPLETED | OUTPATIENT
Start: 2023-06-01 | End: 2023-06-01

## 2023-06-01 RX ORDER — CEFAZOLIN SODIUM/WATER 3 G/30 ML
3 SYRINGE (ML) INTRAVENOUS SEE ADMIN INSTRUCTIONS
Status: DISCONTINUED | OUTPATIENT
Start: 2023-06-01 | End: 2023-06-01 | Stop reason: HOSPADM

## 2023-06-01 RX ORDER — GINSENG 100 MG
CAPSULE ORAL PRN
Status: DISCONTINUED | OUTPATIENT
Start: 2023-06-01 | End: 2023-06-01 | Stop reason: HOSPADM

## 2023-06-01 RX ORDER — ONDANSETRON 4 MG/1
4 TABLET, ORALLY DISINTEGRATING ORAL EVERY 30 MIN PRN
Status: DISCONTINUED | OUTPATIENT
Start: 2023-06-01 | End: 2023-06-01 | Stop reason: HOSPADM

## 2023-06-01 RX ADMIN — ACETAMINOPHEN 975 MG: 325 TABLET ORAL at 11:30

## 2023-06-01 RX ADMIN — KETAMINE HYDROCHLORIDE 20 MG: 10 INJECTION, SOLUTION INTRAMUSCULAR; INTRAVENOUS at 12:17

## 2023-06-01 RX ADMIN — KETAMINE HYDROCHLORIDE 10 MG: 10 INJECTION, SOLUTION INTRAMUSCULAR; INTRAVENOUS at 12:23

## 2023-06-01 RX ADMIN — GLYCOPYRROLATE 0.2 MG: 0.2 INJECTION INTRAMUSCULAR; INTRAVENOUS at 12:17

## 2023-06-01 RX ADMIN — KETAMINE HYDROCHLORIDE 20 MG: 10 INJECTION, SOLUTION INTRAMUSCULAR; INTRAVENOUS at 12:25

## 2023-06-01 RX ADMIN — Medication 3 G: at 12:10

## 2023-06-01 RX ADMIN — MIDAZOLAM 2 MG: 1 INJECTION INTRAMUSCULAR; INTRAVENOUS at 12:10

## 2023-06-01 RX ADMIN — PROPOFOL 20 MG: 10 INJECTION, EMULSION INTRAVENOUS at 12:17

## 2023-06-01 RX ADMIN — PROPOFOL 150 MCG/KG/MIN: 10 INJECTION, EMULSION INTRAVENOUS at 12:14

## 2023-06-01 RX ADMIN — PROPOFOL 30 MG: 10 INJECTION, EMULSION INTRAVENOUS at 12:23

## 2023-06-01 RX ADMIN — PROPOFOL 10 MG: 10 INJECTION, EMULSION INTRAVENOUS at 12:20

## 2023-06-01 RX ADMIN — SODIUM CHLORIDE, POTASSIUM CHLORIDE, SODIUM LACTATE AND CALCIUM CHLORIDE: 600; 310; 30; 20 INJECTION, SOLUTION INTRAVENOUS at 11:29

## 2023-06-01 ASSESSMENT — ACTIVITIES OF DAILY LIVING (ADL)
ADLS_ACUITY_SCORE: 35
ADLS_ACUITY_SCORE: 18

## 2023-06-01 ASSESSMENT — LIFESTYLE VARIABLES: TOBACCO_USE: 1

## 2023-06-01 NOTE — ANESTHESIA PREPROCEDURE EVALUATION
Anesthesia Pre-Procedure Evaluation    Patient: Diamond Mendoza   MRN: [id number] : 1985        Procedure : Procedure(s):  EXCISION, LESION, HEAD          Past Medical History:   Diagnosis Date     Anxiety      Cigarette smoker      Cyst of skin      Depression      Morbid obesity (H)      Snoring       Past Surgical History:   Procedure Laterality Date     ANKLE SURGERY Left      BIOPSY CERVICAL, LOCAL EXCISION, SINGLE/MULTIPLE       LAPAROSCOPIC CHOLECYSTECTOMY N/A 2018    Procedure: CHOLECYSTECTOMY LAPAROSCOPIC;  Surgeon: eLslie Nash MD;  Location: [facility];  Service:      LAPAROSCOPY DIAGNOSTIC (GENERAL) N/A 2018    Procedure: WIDE LOCAL EXCISION OF VULVA CO2 LASER;  Surgeon: Lelia Kauffman MD;  Location: [facility];  Service:       Allergies   Allergen Reactions     Cortisone Hives      Social History     Tobacco Use     Smoking status: Every Day     Packs/day: 0.50     Types: Cigarettes     Start date: 3/6/2003     Smokeless tobacco: Never     Tobacco comments:     not interested   Vaping Use     Vaping status: Not on file   Substance Use Topics     Alcohol use: Yes     Comment: Alcoholic Drinks/day: occasional use      Wt Readings from Last 1 Encounters:   23 142 kg (313 lb)        Anesthesia Evaluation   Pt has had prior anesthetic.         ROS/MED HX  ENT/Pulmonary:     (+) MILY risk factors, snores loudly, obese, tobacco use,     Neurologic:       Cardiovascular:  - neg cardiovascular ROS     METS/Exercise Tolerance:     Hematologic:  - neg hematologic  ROS     Musculoskeletal:  - neg musculoskeletal ROS     GI/Hepatic:  - neg GI/hepatic ROS     Renal/Genitourinary:  - neg Renal ROS     Endo:     (+) Obesity,     Psychiatric/Substance Use:     (+) psychiatric history anxiety and depression     Infectious Disease:       Malignancy:       Other:            Physical Exam    Airway  airway exam normal      Mallampati: II   TM distance: > 3 FB   Neck ROM:  full   Mouth opening: > 3 cm    Respiratory Devices and Support         Dental       (+) Minor Abnormalities - some fillings, tiny chips      Cardiovascular   cardiovascular exam normal       Rhythm and rate: regular and normal     Pulmonary   pulmonary exam normal        breath sounds clear to auscultation           OUTSIDE LABS:  CBC: No results found for: WBC, HGB, HCT, PLT  BMP:   Lab Results   Component Value Date     03/20/2023    POTASSIUM 4.4 03/20/2023    CHLORIDE 106 03/20/2023    CO2 26 03/20/2023    BUN 14.9 03/20/2023    CR 0.65 03/20/2023    GLC 86 03/20/2023     COAGS: No results found for: PTT, INR, FIBR  POC: No results found for: BGM, HCG, HCGS  HEPATIC:   Lab Results   Component Value Date    ALBUMIN 4.2 03/20/2023    PROTTOTAL 7.1 03/20/2023    ALT 25 03/20/2023    AST 32 03/20/2023    ALKPHOS 62 03/20/2023    BILITOTAL 0.3 03/20/2023     OTHER:   Lab Results   Component Value Date    A1C 5.2 03/20/2023    KLEVER 9.7 03/20/2023    TSH 1.05 03/20/2023       Anesthesia Plan    ASA Status:  3   NPO Status:  NPO Appropriate    Anesthesia Type: MAC.              Consents    Anesthesia Plan(s) and associated risks, benefits, and realistic alternatives discussed. Questions answered and patient/representative(s) expressed understanding.    - Discussed:     - Discussed with:  Patient         Postoperative Care    Pain management: Oral pain medications.   PONV prophylaxis: Ondansetron (or other 5HT-3)     Comments:                Lamont Snell MD

## 2023-06-01 NOTE — ANESTHESIA POSTPROCEDURE EVALUATION
Patient: Diamond Mendoza    Procedure: Procedure(s):  EXCISION, LESION, HEAD       Anesthesia Type:  MAC    Note:  Disposition: Outpatient   Postop Pain Control: Uneventful            Sign Out: Well controlled pain   PONV: No   Neuro/Psych: Uneventful            Sign Out: Acceptable/Baseline neuro status   Airway/Respiratory: Uneventful            Sign Out: Acceptable/Baseline resp. status   CV/Hemodynamics: Uneventful            Sign Out: Acceptable CV status; No obvious hypovolemia; No obvious fluid overload   Other NRE: NONE   DID A NON-ROUTINE EVENT OCCUR? No           Last vitals:  Vitals Value Taken Time   /89 06/01/23 1315   Temp 36.8  C (98.2  F) 06/01/23 1315   Pulse 65 06/01/23 1319   Resp 18 06/01/23 1315   SpO2 94 % 06/01/23 1319   Vitals shown include unvalidated device data.    Electronically Signed By: Lamont Snell MD  June 1, 2023  2:36 PM

## 2023-06-01 NOTE — OP NOTE
[facility]  Operative Note    Pre-operative diagnosis: Localized superficial swelling, mass, or lump [R22.9]  Sebaceous cyst [L72.3]   Post-operative diagnosis Sebaceous cyst x2   Procedure: Procedure(s):  EXCISION, LESION, HEAD cyst x2   Surgeon: Sg Clark MD   Assistants(s):  None   Anesthesia: MAC with Local    Estimated blood loss: < 5 ml    Total IV fluids: (See anesthesia record)   Blood transfusion: No transfusion was given during surgery   Total urine output: (See anesthesia record)   Drains: None   Specimens:  Cysts or sebaceous  Times x2   Implants: None     Findings:   Sebaceous cyst 2 cm and a sebaceous cyst 0.5 cm removed.   Complications: None.   Condition: Stable               Description of procedure:    Consent was obtained.  She was taken operative placed in spine position MAC was administered by anesthesia staff.  I shaved her head in the preoperative space and the areas were needed to do surgery.  This area is prepped and draped in sterile manner.  We at this time point did a prior briefing and timeout.  Local anesthetic infused in the area and elliptical incision was made around each of the cyst remove the cyst in its entirety intact and hemostasis attained with pressure.  I closed each excision area with a three 3-0 Vicryl suture in a subcuticular fashion.  And a bacitracin ointment is applied to the wound.  Tolerated seizure well with any problems or difficulties all sponge needle counts are correct.        Sg Clark MD  General Surgery [phone]  Vascular Surgery [phone]

## 2023-06-01 NOTE — OR NURSING
Pt has 2 earrings in ears that she is very reluctant to remove because of difficulty and would need  tattoo parlor to replace.  Spoke with OR staff and Dr Clark who recommend removal, but stated pt has choice.  Pt has decided no to remove earrings even knowing the risks.

## 2023-06-01 NOTE — ANESTHESIA CARE TRANSFER NOTE
Patient: Diamond Mendoza    Procedure: Procedure(s):  EXCISION, LESION, HEAD       Diagnosis: Localized superficial swelling, mass, or lump [R22.9]  Sebaceous cyst [L72.3]  Diagnosis Additional Information: No value filed.    Anesthesia Type:   MAC     Note:    Oropharynx: oropharynx clear of all foreign objects and spontaneously breathing  Level of Consciousness: awake  Oxygen Supplementation: room air    Independent Airway: airway patency satisfactory and stable  Dentition: dentition unchanged  Vital Signs Stable: post-procedure vital signs reviewed and stable  Report to RN Given: handoff report given  Patient transferred to: Phase II    Handoff Report: Identifed the Patient, Identified the Reponsible Provider, Reviewed the pertinent medical history, Discussed the surgical course, Reviewed Intra-OP anesthesia mangement and issues during anesthesia, Set expectations for post-procedure period and Allowed opportunity for questions and acknowledgement of understanding      Vitals:  Vitals Value Taken Time   BP 95/70 06/01/23 1240   Temp 36.7  C (98  F) 06/01/23 1240   Pulse 88 06/01/23 1240   Resp     SpO2 94 % 06/01/23 1240   Vitals shown include unvalidated device data.    Electronically Signed By: KIMBERLYN Rosales CRNA  June 1, 2023  12:42 PM

## 2023-06-04 LAB
HUMAN PAPILLOMA VIRUS 16 DNA: NEGATIVE
HUMAN PAPILLOMA VIRUS 18 DNA: NEGATIVE
HUMAN PAPILLOMA VIRUS FINAL DIAGNOSIS: NORMAL
HUMAN PAPILLOMA VIRUS OTHER HR: NEGATIVE

## 2024-01-22 ENCOUNTER — OFFICE VISIT (OUTPATIENT)
Dept: FAMILY MEDICINE | Facility: CLINIC | Age: 39
End: 2024-01-22
Payer: COMMERCIAL

## 2024-01-22 VITALS
RESPIRATION RATE: 14 BRPM | HEIGHT: 64 IN | TEMPERATURE: 98.2 F | HEART RATE: 74 BPM | BODY MASS INDEX: 50.02 KG/M2 | WEIGHT: 293 LBS | DIASTOLIC BLOOD PRESSURE: 78 MMHG | SYSTOLIC BLOOD PRESSURE: 119 MMHG | OXYGEN SATURATION: 96 %

## 2024-01-22 DIAGNOSIS — E66.01 MORBID OBESITY (H): ICD-10-CM

## 2024-01-22 DIAGNOSIS — R05.1 ACUTE COUGH: Primary | ICD-10-CM

## 2024-01-22 DIAGNOSIS — H69.90 DYSFUNCTION OF EUSTACHIAN TUBE, UNSPECIFIED LATERALITY: ICD-10-CM

## 2024-01-22 DIAGNOSIS — F17.210 CIGARETTE SMOKER: ICD-10-CM

## 2024-01-22 DIAGNOSIS — R09.89 CHEST CONGESTION: ICD-10-CM

## 2024-01-22 DIAGNOSIS — R09.89 RUNNY NOSE: ICD-10-CM

## 2024-01-22 PROBLEM — G40.909 SEIZURE DISORDER (H): Status: ACTIVE | Noted: 2024-01-22

## 2024-01-22 PROBLEM — F31.9 BIPOLAR DISORDER (H): Status: ACTIVE | Noted: 2024-01-22

## 2024-01-22 PROBLEM — A63.0 GENITAL WARTS: Status: ACTIVE | Noted: 2024-01-22

## 2024-01-22 PROCEDURE — 99213 OFFICE O/P EST LOW 20 MIN: CPT | Performed by: NURSE PRACTITIONER

## 2024-01-22 RX ORDER — DOXYCYCLINE 100 MG/1
100 TABLET ORAL 2 TIMES DAILY
Qty: 14 TABLET | Refills: 0 | Status: SHIPPED | OUTPATIENT
Start: 2024-01-22 | End: 2024-01-29

## 2024-01-22 ASSESSMENT — ENCOUNTER SYMPTOMS: COUGH: 1

## 2024-01-22 NOTE — PROGRESS NOTES
"  Assessment & Plan     Acute cough  *  - doxycycline monohydrate (ADOXA) 100 MG tablet  Dispense: 14 tablet; Refill: 0    Cigarette smoker  *    Morbid obesity (H)  *    Dysfunction of Eustachian tube, unspecified laterality  *    Chest congestion  *    Runny nose  *    - I offered smoking cessation and she declined.   - on-going symptoms with risk factors. Doxy ordered. Follow up with if worse in 3-5 days. Post viral cough syndrome discussed. Conservative ways to manage discussed.   - EUS discussed. Can take Sudafed            BMI  Estimated body mass index is 53.21 kg/m  as calculated from the following:    Height as of this encounter: 1.626 m (5' 4\").    Weight as of this encounter: 140.6 kg (310 lb).   Weight management plan: Patient was referred to their PCP to discuss a diet and exercise plan.        Subjective   Diamond is a 38 year old, presenting for the following health issues:  Sinus Problem (Started 1x week), Ear Problem, and Cough      1/22/2024     1:31 PM   Additional Questions   Roomed by Antoine     - ears crackling. No drainage or pain. Throat feels okay. Chest congestion, with non-productive cough. Feels like she needs to cough up something but can't. Sudafed not helping. Runny nose. No headaches. No sob. Smoker. Works at a group home. Onset 7 days ago.     History of Present Illness       Reason for visit:  Sinus  Symptom onset:  3-7 days ago  Symptom intensity:  Moderate  Symptom progression:  Worsening  Had these symptoms before:  Yes  Has tried/received treatment for these symptoms:  Yes  Previous treatment was successful:  No    She eats 2-3 servings of fruits and vegetables daily.She consumes 2 sweetened beverage(s) daily.She exercises with enough effort to increase her heart rate 30 to 60 minutes per day.  She exercises with enough effort to increase her heart rate 5 days per week.   She is taking medications regularly.           Objective    /78   Pulse 74   Temp 98.2  F (36.8  C) " "(Oral)   Resp 14   Ht 1.626 m (5' 4\")   Wt 140.6 kg (310 lb)   LMP 12/22/2023 (Approximate)   SpO2 96%   BMI 53.21 kg/m    Body mass index is 53.21 kg/m .  Physical Exam  Vitals and nursing note reviewed.   Constitutional:       Appearance: Normal appearance.   HENT:      Right Ear: No drainage, swelling or tenderness. Tympanic membrane is bulging. Tympanic membrane is not perforated or erythematous.      Left Ear: No drainage, swelling or tenderness. Tympanic membrane is bulging. Tympanic membrane is not perforated or erythematous.      Mouth/Throat:      Mouth: Mucous membranes are moist.      Pharynx: No oropharyngeal exudate or posterior oropharyngeal erythema.   Cardiovascular:      Rate and Rhythm: Normal rate.   Pulmonary:      Effort: Pulmonary effort is normal.      Breath sounds: Normal breath sounds.   Musculoskeletal:      Cervical back: Normal range of motion.   Lymphadenopathy:      Cervical: No cervical adenopathy.   Skin:     General: Skin is warm.      Capillary Refill: Capillary refill takes less than 2 seconds.   Neurological:      General: No focal deficit present.      Mental Status: She is alert and oriented to person, place, and time.   Psychiatric:         Mood and Affect: Mood normal.         Behavior: Behavior normal.         Thought Content: Thought content normal.         Judgment: Judgment normal.                Signed Electronically by: KIMBERLYN Ybarra CNP    "

## 2024-07-21 ENCOUNTER — HEALTH MAINTENANCE LETTER (OUTPATIENT)
Age: 39
End: 2024-07-21

## (undated) DEVICE — PLATE GROUNDING ADULT W/CORD 9165L

## (undated) DEVICE — GLOVE BIOGEL PI ORTHOPRO SZ 7.5 47675

## (undated) DEVICE — SUCTION MANIFOLD NEPTUNE 2 SYS 1 PORT 702-025-000

## (undated) DEVICE — SU VICRYL+ 3-0 27IN SH UND VCP416H

## (undated) DEVICE — ESU PENCIL SMOKE EVAC W/ROCKER SWITCH 0703-047-000

## (undated) DEVICE — CUSTOM PACK MINOR SBA5BMNHEA

## (undated) DEVICE — SOL WATER IRRIG 1000ML BOTTLE 2F7114

## (undated) DEVICE — NDL 25GA 1.5" 305127

## (undated) DEVICE — PREP CHLORAPREP W/ORANGE TINT 10.5ML 930715

## (undated) RX ORDER — LIDOCAINE HYDROCHLORIDE 10 MG/ML
INJECTION, SOLUTION EPIDURAL; INFILTRATION; INTRACAUDAL; PERINEURAL
Status: DISPENSED
Start: 2023-06-01

## (undated) RX ORDER — PROPOFOL 10 MG/ML
INJECTION, EMULSION INTRAVENOUS
Status: DISPENSED
Start: 2023-06-01

## (undated) RX ORDER — GINSENG 100 MG
CAPSULE ORAL
Status: DISPENSED
Start: 2023-06-01

## (undated) RX ORDER — BUPIVACAINE HYDROCHLORIDE 2.5 MG/ML
INJECTION, SOLUTION EPIDURAL; INFILTRATION; INTRACAUDAL
Status: DISPENSED
Start: 2023-06-01

## (undated) RX ORDER — BUPIVACAINE HYDROCHLORIDE AND EPINEPHRINE 2.5; 5 MG/ML; UG/ML
INJECTION, SOLUTION EPIDURAL; INFILTRATION; INTRACAUDAL; PERINEURAL
Status: DISPENSED
Start: 2023-06-01